# Patient Record
Sex: FEMALE | Race: WHITE | NOT HISPANIC OR LATINO | Employment: FULL TIME | URBAN - METROPOLITAN AREA
[De-identification: names, ages, dates, MRNs, and addresses within clinical notes are randomized per-mention and may not be internally consistent; named-entity substitution may affect disease eponyms.]

---

## 2018-09-20 ENCOUNTER — HOSPITAL ENCOUNTER (OUTPATIENT)
Facility: MEDICAL CENTER | Age: 63
End: 2018-09-20
Attending: SPECIALIST
Payer: COMMERCIAL

## 2018-09-20 LAB
CREAT UR-MCNC: 16.5 MG/DL
PROT UR-MCNC: <4 MG/DL (ref 0–15)
PROT/CREAT UR: NORMAL MG/G (ref 10–107)

## 2018-09-20 PROCEDURE — 84156 ASSAY OF PROTEIN URINE: CPT

## 2018-09-20 PROCEDURE — 82570 ASSAY OF URINE CREATININE: CPT

## 2018-09-27 ENCOUNTER — HOSPITAL ENCOUNTER (OUTPATIENT)
Dept: RADIOLOGY | Facility: MEDICAL CENTER | Age: 63
End: 2018-09-27
Attending: OBSTETRICS & GYNECOLOGY
Payer: COMMERCIAL

## 2018-09-27 DIAGNOSIS — Z78.0 MENOPAUSE: ICD-10-CM

## 2018-09-27 DIAGNOSIS — M89.9 BONE DISEASE: ICD-10-CM

## 2018-09-27 PROCEDURE — 77080 DXA BONE DENSITY AXIAL: CPT

## 2018-11-15 ENCOUNTER — HOSPITAL ENCOUNTER (OUTPATIENT)
Dept: LAB | Facility: MEDICAL CENTER | Age: 63
End: 2018-11-15
Attending: SPECIALIST
Payer: COMMERCIAL

## 2018-11-15 LAB
25(OH)D3 SERPL-MCNC: 48 NG/ML (ref 30–100)
APPEARANCE UR: CLEAR
BACTERIA #/AREA URNS HPF: NEGATIVE /HPF
BASOPHILS # BLD AUTO: 1.6 % (ref 0–1.8)
BASOPHILS # BLD: 0.06 K/UL (ref 0–0.12)
BILIRUB UR QL STRIP.AUTO: NEGATIVE
COLOR UR: YELLOW
EOSINOPHIL # BLD AUTO: 0.16 K/UL (ref 0–0.51)
EOSINOPHIL NFR BLD: 4.3 % (ref 0–6.9)
EPI CELLS #/AREA URNS HPF: ABNORMAL /HPF
ERYTHROCYTE [DISTWIDTH] IN BLOOD BY AUTOMATED COUNT: 47.4 FL (ref 35.9–50)
GLUCOSE UR STRIP.AUTO-MCNC: NEGATIVE MG/DL
HCT VFR BLD AUTO: 43.8 % (ref 37–47)
HGB BLD-MCNC: 14.2 G/DL (ref 12–16)
HYALINE CASTS #/AREA URNS LPF: ABNORMAL /LPF
IMM GRANULOCYTES # BLD AUTO: 0.01 K/UL (ref 0–0.11)
IMM GRANULOCYTES NFR BLD AUTO: 0.3 % (ref 0–0.9)
KETONES UR STRIP.AUTO-MCNC: NEGATIVE MG/DL
LEUKOCYTE ESTERASE UR QL STRIP.AUTO: ABNORMAL
LYMPHOCYTES # BLD AUTO: 1.25 K/UL (ref 1–4.8)
LYMPHOCYTES NFR BLD: 33.6 % (ref 22–41)
MCH RBC QN AUTO: 31.3 PG (ref 27–33)
MCHC RBC AUTO-ENTMCNC: 32.4 G/DL (ref 33.6–35)
MCV RBC AUTO: 96.5 FL (ref 81.4–97.8)
MICRO URNS: ABNORMAL
MONOCYTES # BLD AUTO: 0.47 K/UL (ref 0–0.85)
MONOCYTES NFR BLD AUTO: 12.6 % (ref 0–13.4)
NEUTROPHILS # BLD AUTO: 1.77 K/UL (ref 2–7.15)
NEUTROPHILS NFR BLD: 47.6 % (ref 44–72)
NITRITE UR QL STRIP.AUTO: NEGATIVE
NRBC # BLD AUTO: 0 K/UL
NRBC BLD-RTO: 0 /100 WBC
PH UR STRIP.AUTO: 7 [PH]
PLATELET # BLD AUTO: 193 K/UL (ref 164–446)
PMV BLD AUTO: 10.8 FL (ref 9–12.9)
PROT UR QL STRIP: NEGATIVE MG/DL
RBC # BLD AUTO: 4.54 M/UL (ref 4.2–5.4)
RBC # URNS HPF: ABNORMAL /HPF
RBC UR QL AUTO: NEGATIVE
SP GR UR STRIP.AUTO: 1.01
UROBILINOGEN UR STRIP.AUTO-MCNC: 0.2 MG/DL
WBC # BLD AUTO: 3.7 K/UL (ref 4.8–10.8)
WBC #/AREA URNS HPF: ABNORMAL /HPF

## 2018-11-15 PROCEDURE — 82570 ASSAY OF URINE CREATININE: CPT

## 2018-11-15 PROCEDURE — 81001 URINALYSIS AUTO W/SCOPE: CPT

## 2018-11-15 PROCEDURE — 36415 COLL VENOUS BLD VENIPUNCTURE: CPT

## 2018-11-15 PROCEDURE — 80076 HEPATIC FUNCTION PANEL: CPT

## 2018-11-15 PROCEDURE — 82306 VITAMIN D 25 HYDROXY: CPT

## 2018-11-15 PROCEDURE — 85025 COMPLETE CBC W/AUTO DIFF WBC: CPT

## 2018-11-15 PROCEDURE — 82565 ASSAY OF CREATININE: CPT

## 2018-11-15 PROCEDURE — 84156 ASSAY OF PROTEIN URINE: CPT

## 2018-11-16 LAB
ALBUMIN SERPL BCP-MCNC: 4.4 G/DL (ref 3.2–4.9)
ALP SERPL-CCNC: 47 U/L (ref 30–99)
ALT SERPL-CCNC: 19 U/L (ref 2–50)
AST SERPL-CCNC: 24 U/L (ref 12–45)
BILIRUB CONJ SERPL-MCNC: 0.2 MG/DL (ref 0.1–0.5)
BILIRUB INDIRECT SERPL-MCNC: 0.3 MG/DL (ref 0–1)
BILIRUB SERPL-MCNC: 0.5 MG/DL (ref 0.1–1.5)
CREAT SERPL-MCNC: 0.86 MG/DL (ref 0.5–1.4)
CREAT UR-MCNC: 46.8 MG/DL
PROT SERPL-MCNC: 7.1 G/DL (ref 6–8.2)
PROT UR-MCNC: <4 MG/DL (ref 0–15)
PROT/CREAT UR: NORMAL MG/G (ref 10–107)

## 2018-12-24 ENCOUNTER — HOSPITAL ENCOUNTER (OUTPATIENT)
Dept: LAB | Facility: MEDICAL CENTER | Age: 63
End: 2018-12-24
Attending: SPECIALIST
Payer: COMMERCIAL

## 2018-12-24 LAB
BASOPHILS # BLD AUTO: 1.6 % (ref 0–1.8)
BASOPHILS # BLD: 0.06 K/UL (ref 0–0.12)
C3 SERPL-MCNC: 146 MG/DL (ref 87–200)
C4 SERPL-MCNC: 31 MG/DL (ref 19–52)
CREAT SERPL-MCNC: 0.95 MG/DL (ref 0.5–1.4)
CREAT UR-MCNC: 223.5 MG/DL
EOSINOPHIL # BLD AUTO: 0.15 K/UL (ref 0–0.51)
EOSINOPHIL NFR BLD: 4 % (ref 0–6.9)
ERYTHROCYTE [DISTWIDTH] IN BLOOD BY AUTOMATED COUNT: 49 FL (ref 35.9–50)
HCT VFR BLD AUTO: 45.2 % (ref 37–47)
HGB BLD-MCNC: 14.2 G/DL (ref 12–16)
IMM GRANULOCYTES # BLD AUTO: 0.01 K/UL (ref 0–0.11)
IMM GRANULOCYTES NFR BLD AUTO: 0.3 % (ref 0–0.9)
LYMPHOCYTES # BLD AUTO: 1.13 K/UL (ref 1–4.8)
LYMPHOCYTES NFR BLD: 30.2 % (ref 22–41)
MCH RBC QN AUTO: 30.9 PG (ref 27–33)
MCHC RBC AUTO-ENTMCNC: 31.4 G/DL (ref 33.6–35)
MCV RBC AUTO: 98.5 FL (ref 81.4–97.8)
MONOCYTES # BLD AUTO: 0.4 K/UL (ref 0–0.85)
MONOCYTES NFR BLD AUTO: 10.7 % (ref 0–13.4)
NEUTROPHILS # BLD AUTO: 1.99 K/UL (ref 2–7.15)
NEUTROPHILS NFR BLD: 53.2 % (ref 44–72)
NRBC # BLD AUTO: 0 K/UL
NRBC BLD-RTO: 0 /100 WBC
PLATELET # BLD AUTO: 207 K/UL (ref 164–446)
PMV BLD AUTO: 10.9 FL (ref 9–12.9)
PROT UR-MCNC: 70.3 MG/DL (ref 0–15)
PROT/CREAT UR: 315 MG/G (ref 10–107)
RBC # BLD AUTO: 4.59 M/UL (ref 4.2–5.4)
WBC # BLD AUTO: 3.7 K/UL (ref 4.8–10.8)

## 2018-12-24 PROCEDURE — 82565 ASSAY OF CREATININE: CPT

## 2018-12-24 PROCEDURE — 86160 COMPLEMENT ANTIGEN: CPT

## 2018-12-24 PROCEDURE — 36415 COLL VENOUS BLD VENIPUNCTURE: CPT

## 2018-12-24 PROCEDURE — 82570 ASSAY OF URINE CREATININE: CPT

## 2018-12-24 PROCEDURE — 85025 COMPLETE CBC W/AUTO DIFF WBC: CPT

## 2018-12-24 PROCEDURE — 84156 ASSAY OF PROTEIN URINE: CPT

## 2019-02-28 ENCOUNTER — HOSPITAL ENCOUNTER (OUTPATIENT)
Dept: RADIOLOGY | Facility: MEDICAL CENTER | Age: 64
End: 2019-02-28
Attending: SPECIALIST
Payer: COMMERCIAL

## 2019-02-28 DIAGNOSIS — R20.2 PARESTHESIA: ICD-10-CM

## 2019-02-28 DIAGNOSIS — M54.2 CERVICALGIA: ICD-10-CM

## 2019-02-28 PROCEDURE — 72050 X-RAY EXAM NECK SPINE 4/5VWS: CPT

## 2019-04-04 ENCOUNTER — HOSPITAL ENCOUNTER (OUTPATIENT)
Dept: LAB | Facility: MEDICAL CENTER | Age: 64
End: 2019-04-04
Attending: SPECIALIST
Payer: COMMERCIAL

## 2019-04-04 LAB
ALBUMIN SERPL BCP-MCNC: 4.2 G/DL (ref 3.2–4.9)
ALP SERPL-CCNC: 53 U/L (ref 30–99)
ALT SERPL-CCNC: 23 U/L (ref 2–50)
AST SERPL-CCNC: 24 U/L (ref 12–45)
BASOPHILS # BLD AUTO: 1 % (ref 0–1.8)
BASOPHILS # BLD: 0.05 K/UL (ref 0–0.12)
BILIRUB CONJ SERPL-MCNC: <0.1 MG/DL (ref 0.1–0.5)
BILIRUB INDIRECT SERPL-MCNC: NORMAL MG/DL (ref 0–1)
BILIRUB SERPL-MCNC: 0.2 MG/DL (ref 0.1–1.5)
CREAT SERPL-MCNC: 0.87 MG/DL (ref 0.5–1.4)
CREAT UR-MCNC: 113.2 MG/DL
EOSINOPHIL # BLD AUTO: 0.19 K/UL (ref 0–0.51)
EOSINOPHIL NFR BLD: 4 % (ref 0–6.9)
ERYTHROCYTE [DISTWIDTH] IN BLOOD BY AUTOMATED COUNT: 47.9 FL (ref 35.9–50)
HCT VFR BLD AUTO: 38.8 % (ref 37–47)
HGB BLD-MCNC: 12.8 G/DL (ref 12–16)
IMM GRANULOCYTES # BLD AUTO: 0.01 K/UL (ref 0–0.11)
IMM GRANULOCYTES NFR BLD AUTO: 0.2 % (ref 0–0.9)
LYMPHOCYTES # BLD AUTO: 1.5 K/UL (ref 1–4.8)
LYMPHOCYTES NFR BLD: 31.3 % (ref 22–41)
MCH RBC QN AUTO: 31.8 PG (ref 27–33)
MCHC RBC AUTO-ENTMCNC: 33 G/DL (ref 33.6–35)
MCV RBC AUTO: 96.5 FL (ref 81.4–97.8)
MONOCYTES # BLD AUTO: 0.49 K/UL (ref 0–0.85)
MONOCYTES NFR BLD AUTO: 10.2 % (ref 0–13.4)
NEUTROPHILS # BLD AUTO: 2.56 K/UL (ref 2–7.15)
NEUTROPHILS NFR BLD: 53.3 % (ref 44–72)
NRBC # BLD AUTO: 0 K/UL
NRBC BLD-RTO: 0 /100 WBC
PLATELET # BLD AUTO: 194 K/UL (ref 164–446)
PMV BLD AUTO: 11.2 FL (ref 9–12.9)
PROT SERPL-MCNC: 6.6 G/DL (ref 6–8.2)
PROT UR-MCNC: 7 MG/DL (ref 0–15)
PROT/CREAT UR: 62 MG/G (ref 10–107)
RBC # BLD AUTO: 4.02 M/UL (ref 4.2–5.4)
WBC # BLD AUTO: 4.8 K/UL (ref 4.8–10.8)

## 2019-04-04 PROCEDURE — 85025 COMPLETE CBC W/AUTO DIFF WBC: CPT

## 2019-04-04 PROCEDURE — 81001 URINALYSIS AUTO W/SCOPE: CPT

## 2019-04-04 PROCEDURE — 80076 HEPATIC FUNCTION PANEL: CPT

## 2019-04-04 PROCEDURE — 82570 ASSAY OF URINE CREATININE: CPT

## 2019-04-04 PROCEDURE — 84156 ASSAY OF PROTEIN URINE: CPT

## 2019-04-04 PROCEDURE — 36415 COLL VENOUS BLD VENIPUNCTURE: CPT

## 2019-04-04 PROCEDURE — 82565 ASSAY OF CREATININE: CPT

## 2019-04-05 LAB
APPEARANCE UR: ABNORMAL
BACTERIA #/AREA URNS HPF: NEGATIVE /HPF
BILIRUB UR QL STRIP.AUTO: NEGATIVE
CAOX CRY #/AREA URNS HPF: ABNORMAL /HPF
COLOR UR: YELLOW
EPI CELLS #/AREA URNS HPF: NEGATIVE /HPF
GLUCOSE UR STRIP.AUTO-MCNC: NEGATIVE MG/DL
HYALINE CASTS #/AREA URNS LPF: ABNORMAL /LPF
KETONES UR STRIP.AUTO-MCNC: NEGATIVE MG/DL
LEUKOCYTE ESTERASE UR QL STRIP.AUTO: ABNORMAL
MICRO URNS: ABNORMAL
NITRITE UR QL STRIP.AUTO: NEGATIVE
PH UR STRIP.AUTO: 7 [PH]
PROT UR QL STRIP: NEGATIVE MG/DL
RBC # URNS HPF: ABNORMAL /HPF
RBC UR QL AUTO: NEGATIVE
SP GR UR STRIP.AUTO: 1.02
UROBILINOGEN UR STRIP.AUTO-MCNC: 0.2 MG/DL
WBC #/AREA URNS HPF: ABNORMAL /HPF

## 2019-04-19 ENCOUNTER — HOSPITAL ENCOUNTER (OUTPATIENT)
Dept: RADIOLOGY | Facility: MEDICAL CENTER | Age: 64
End: 2019-04-19
Attending: SPECIALIST
Payer: COMMERCIAL

## 2019-04-19 VITALS — BODY MASS INDEX: 19.62 KG/M2 | HEIGHT: 67 IN | WEIGHT: 125 LBS

## 2019-04-19 DIAGNOSIS — R20.2 PARESTHESIA: ICD-10-CM

## 2019-04-19 DIAGNOSIS — M32.19 LUPUS ENCEPHALITIS (HCC): ICD-10-CM

## 2019-04-19 DIAGNOSIS — G05.3 LUPUS ENCEPHALITIS (HCC): ICD-10-CM

## 2019-04-19 DIAGNOSIS — M54.2 CERVICALGIA: ICD-10-CM

## 2019-04-19 PROCEDURE — 700102 HCHG RX REV CODE 250 W/ 637 OVERRIDE(OP): Performed by: RADIOLOGY

## 2019-04-19 PROCEDURE — 72141 MRI NECK SPINE W/O DYE: CPT

## 2019-04-19 PROCEDURE — A9270 NON-COVERED ITEM OR SERVICE: HCPCS | Performed by: RADIOLOGY

## 2019-04-19 RX ORDER — ALPRAZOLAM 1 MG/1
0.5 TABLET ORAL
Status: COMPLETED | OUTPATIENT
Start: 2019-04-19 | End: 2019-04-19

## 2019-04-19 RX ADMIN — ALPRAZOLAM 0.5 MG: 1 TABLET ORAL at 10:07

## 2019-04-19 NOTE — DISCHARGE INSTRUCTIONS
MRI ADULT DISCHARGE INSTRUCTIONS    You have been medicated today for your scan. Please follow the instructions below to ensure your safe recovery. If you have any questions or problems, feel free to call us at [phone] or [phone].     1.   Have someone stay with you to assist you as needed.    2.   Do not drive or operate any mechanical devices.    3.   Do not perform any activity that requires concentration. Make no major decisions over the next 24 hours.     4.   Be careful changing positions from laying down to sitting or standing, as you may become dizzy.     5.   Do not drink alcohol for 48 hours.    6.   There are no restrictions for eating your normal meals. Drink fluids.    7.   You may continue your usual medications for pain, tranquilizers, muscle relaxants or sedatives when awake.     8.   Tomorrow, you may continue your normal daily activities.     9.   Pressure dressing on 10 - 15 minutes. If swelling or bleeding occurs when removed, continue placing direct pressure on injection site for another 5 minutes, or until bleeding stops.   Alprazolam (XANAX) tablet  What is this medicine?  You were prescribed ALPRAZOLAM (al PRAY shiloh gil) for the procedure you had today. This medication is a benzodiazepine. It is used to treat anxiety and panic attacks.  This medicine may be used for other purposes; ask your health care provider or pharmacist if you have questions.  What side effects may I notice from receiving this medicine?  Side effects that you should report to your doctor or health care professional as soon as possible:  • allergic reactions like skin rash, itching or hives, swelling of the face, lips, or tongue  • confusion, forgetfulness  • depression  • difficulty sleeping  • difficulty speaking  • feeling faint or lightheaded, falls  • mood changes, excitability or aggressive behavior  • muscle cramps  • trouble passing urine or change in the amount of urine  • unusually weak or tired  Side effects  that usually do not require medical attention (report to your doctor or health care professional if they continue or are bothersome):  • changes in appetite  • change in sex drive or performance  This list may not describe all possible side effects. Call your doctor for medical advice about side effects. You may report side effects to FDA at 0-752-IMN-6977.      I have been informed of and understand the above discharge instructions.

## 2022-07-22 ENCOUNTER — HOSPITAL ENCOUNTER (OUTPATIENT)
Dept: RADIOLOGY | Facility: MEDICAL CENTER | Age: 67
End: 2022-07-22
Attending: NURSE PRACTITIONER
Payer: MEDICARE

## 2022-07-22 DIAGNOSIS — R19.8 RASPBERRY TONGUE: ICD-10-CM

## 2022-07-22 DIAGNOSIS — R10.13 ABDOMINAL PAIN, EPIGASTRIC: ICD-10-CM

## 2022-07-22 PROCEDURE — 74019 RADEX ABDOMEN 2 VIEWS: CPT

## 2022-09-17 ENCOUNTER — HOSPITAL ENCOUNTER (OUTPATIENT)
Dept: LAB | Facility: MEDICAL CENTER | Age: 67
End: 2022-09-17
Attending: NURSE PRACTITIONER
Payer: MEDICARE

## 2022-09-17 LAB
ALBUMIN SERPL BCP-MCNC: 4.5 G/DL (ref 3.2–4.9)
ALBUMIN/GLOB SERPL: 2 G/DL
ALP SERPL-CCNC: 56 U/L (ref 30–99)
ALT SERPL-CCNC: 20 U/L (ref 2–50)
ANION GAP SERPL CALC-SCNC: 13 MMOL/L (ref 7–16)
AST SERPL-CCNC: 25 U/L (ref 12–45)
BASOPHILS # BLD AUTO: 1.9 % (ref 0–1.8)
BASOPHILS # BLD: 0.06 K/UL (ref 0–0.12)
BILIRUB SERPL-MCNC: 0.4 MG/DL (ref 0.1–1.5)
BUN SERPL-MCNC: 14 MG/DL (ref 8–22)
CALCIUM SERPL-MCNC: 9 MG/DL (ref 8.5–10.5)
CHLORIDE SERPL-SCNC: 104 MMOL/L (ref 96–112)
CO2 SERPL-SCNC: 24 MMOL/L (ref 20–33)
CREAT SERPL-MCNC: 0.98 MG/DL (ref 0.5–1.4)
EOSINOPHIL # BLD AUTO: 0.07 K/UL (ref 0–0.51)
EOSINOPHIL NFR BLD: 2.3 % (ref 0–6.9)
ERYTHROCYTE [DISTWIDTH] IN BLOOD BY AUTOMATED COUNT: 50.4 FL (ref 35.9–50)
GFR SERPLBLD CREATININE-BSD FMLA CKD-EPI: 64 ML/MIN/1.73 M 2
GLOBULIN SER CALC-MCNC: 2.3 G/DL (ref 1.9–3.5)
GLUCOSE SERPL-MCNC: 85 MG/DL (ref 65–99)
HCT VFR BLD AUTO: 44.3 % (ref 37–47)
HGB BLD-MCNC: 14.6 G/DL (ref 12–16)
IMM GRANULOCYTES # BLD AUTO: 0.01 K/UL (ref 0–0.11)
IMM GRANULOCYTES NFR BLD AUTO: 0.3 % (ref 0–0.9)
LYMPHOCYTES # BLD AUTO: 0.96 K/UL (ref 1–4.8)
LYMPHOCYTES NFR BLD: 31 % (ref 22–41)
MCH RBC QN AUTO: 31.7 PG (ref 27–33)
MCHC RBC AUTO-ENTMCNC: 33 G/DL (ref 33.6–35)
MCV RBC AUTO: 96.3 FL (ref 81.4–97.8)
MONOCYTES # BLD AUTO: 0.58 K/UL (ref 0–0.85)
MONOCYTES NFR BLD AUTO: 18.7 % (ref 0–13.4)
NEUTROPHILS # BLD AUTO: 1.42 K/UL (ref 2–7.15)
NEUTROPHILS NFR BLD: 45.8 % (ref 44–72)
NRBC # BLD AUTO: 0 K/UL
NRBC BLD-RTO: 0 /100 WBC
PLATELET # BLD AUTO: 185 K/UL (ref 164–446)
PMV BLD AUTO: 11.3 FL (ref 9–12.9)
POTASSIUM SERPL-SCNC: 3.8 MMOL/L (ref 3.6–5.5)
PROT SERPL-MCNC: 6.8 G/DL (ref 6–8.2)
RBC # BLD AUTO: 4.6 M/UL (ref 4.2–5.4)
SODIUM SERPL-SCNC: 141 MMOL/L (ref 135–145)
WBC # BLD AUTO: 3.1 K/UL (ref 4.8–10.8)

## 2022-09-17 PROCEDURE — 85025 COMPLETE CBC W/AUTO DIFF WBC: CPT

## 2022-09-17 PROCEDURE — 36415 COLL VENOUS BLD VENIPUNCTURE: CPT

## 2022-09-17 PROCEDURE — 80053 COMPREHEN METABOLIC PANEL: CPT

## 2022-10-19 ENCOUNTER — HOSPITAL ENCOUNTER (OUTPATIENT)
Dept: LAB | Facility: MEDICAL CENTER | Age: 67
End: 2022-10-19
Attending: FAMILY MEDICINE
Payer: COMMERCIAL

## 2022-10-19 LAB
BASOPHILS # BLD AUTO: 1.2 % (ref 0–1.8)
BASOPHILS # BLD: 0.05 K/UL (ref 0–0.12)
EOSINOPHIL # BLD AUTO: 0.08 K/UL (ref 0–0.51)
EOSINOPHIL NFR BLD: 2 % (ref 0–6.9)
ERYTHROCYTE [DISTWIDTH] IN BLOOD BY AUTOMATED COUNT: 49.1 FL (ref 35.9–50)
HCT VFR BLD AUTO: 39.8 % (ref 37–47)
HGB BLD-MCNC: 12.9 G/DL (ref 12–16)
IMM GRANULOCYTES # BLD AUTO: 0.01 K/UL (ref 0–0.11)
IMM GRANULOCYTES NFR BLD AUTO: 0.2 % (ref 0–0.9)
LYMPHOCYTES # BLD AUTO: 1.4 K/UL (ref 1–4.8)
LYMPHOCYTES NFR BLD: 34.7 % (ref 22–41)
MCH RBC QN AUTO: 31.2 PG (ref 27–33)
MCHC RBC AUTO-ENTMCNC: 32.4 G/DL (ref 33.6–35)
MCV RBC AUTO: 96.1 FL (ref 81.4–97.8)
MONOCYTES # BLD AUTO: 0.48 K/UL (ref 0–0.85)
MONOCYTES NFR BLD AUTO: 11.9 % (ref 0–13.4)
NEUTROPHILS # BLD AUTO: 2.02 K/UL (ref 2–7.15)
NEUTROPHILS NFR BLD: 50 % (ref 44–72)
NRBC # BLD AUTO: 0 K/UL
NRBC BLD-RTO: 0 /100 WBC
PLATELET # BLD AUTO: 196 K/UL (ref 164–446)
PMV BLD AUTO: 10.5 FL (ref 9–12.9)
RBC # BLD AUTO: 4.14 M/UL (ref 4.2–5.4)
WBC # BLD AUTO: 4 K/UL (ref 4.8–10.8)

## 2022-10-19 PROCEDURE — 36415 COLL VENOUS BLD VENIPUNCTURE: CPT

## 2022-10-19 PROCEDURE — 86663 EPSTEIN-BARR ANTIBODY: CPT

## 2022-10-19 PROCEDURE — 86664 EPSTEIN-BARR NUCLEAR ANTIGEN: CPT

## 2022-10-19 PROCEDURE — 86665 EPSTEIN-BARR CAPSID VCA: CPT | Mod: 91

## 2022-10-19 PROCEDURE — 85025 COMPLETE CBC W/AUTO DIFF WBC: CPT

## 2022-10-21 LAB
EBV EA-D IGG SER-ACNC: <5 U/ML (ref 0–10.9)
EBV NA IGG SER IA-ACNC: 126 U/ML (ref 0–21.9)
EBV VCA IGG SER IA-ACNC: >750 U/ML (ref 0–21.9)
EBV VCA IGM SER IA-ACNC: <10 U/ML (ref 0–43.9)

## 2022-11-11 ENCOUNTER — PATIENT MESSAGE (OUTPATIENT)
Dept: HEALTH INFORMATION MANAGEMENT | Facility: OTHER | Age: 67
End: 2022-11-11

## 2023-09-07 ENCOUNTER — APPOINTMENT (OUTPATIENT)
Dept: ADMISSIONS | Facility: MEDICAL CENTER | Age: 68
End: 2023-09-07
Attending: PODIATRIST
Payer: COMMERCIAL

## 2023-09-13 ENCOUNTER — PRE-ADMISSION TESTING (OUTPATIENT)
Dept: ADMISSIONS | Facility: MEDICAL CENTER | Age: 68
End: 2023-09-13
Attending: PODIATRIST
Payer: COMMERCIAL

## 2023-09-13 RX ORDER — BUPROPION HYDROCHLORIDE 150 MG/1
150 TABLET ORAL DAILY
COMMUNITY
End: 2023-09-13

## 2023-09-13 RX ORDER — FLUTICASONE FUROATE, UMECLIDINIUM BROMIDE AND VILANTEROL TRIFENATATE 100; 62.5; 25 UG/1; UG/1; UG/1
1 POWDER RESPIRATORY (INHALATION) DAILY
COMMUNITY
Start: 2023-09-01

## 2023-09-15 ENCOUNTER — ANESTHESIA EVENT (OUTPATIENT)
Dept: SURGERY | Facility: MEDICAL CENTER | Age: 68
End: 2023-09-15
Payer: COMMERCIAL

## 2023-09-15 ENCOUNTER — APPOINTMENT (OUTPATIENT)
Dept: RADIOLOGY | Facility: MEDICAL CENTER | Age: 68
End: 2023-09-15
Attending: PODIATRIST
Payer: COMMERCIAL

## 2023-09-15 ENCOUNTER — ANESTHESIA (OUTPATIENT)
Dept: SURGERY | Facility: MEDICAL CENTER | Age: 68
End: 2023-09-15
Payer: COMMERCIAL

## 2023-09-15 ENCOUNTER — HOSPITAL ENCOUNTER (OUTPATIENT)
Facility: MEDICAL CENTER | Age: 68
End: 2023-09-15
Attending: PODIATRIST | Admitting: PODIATRIST
Payer: COMMERCIAL

## 2023-09-15 VITALS
OXYGEN SATURATION: 92 % | HEIGHT: 67 IN | HEART RATE: 82 BPM | DIASTOLIC BLOOD PRESSURE: 66 MMHG | RESPIRATION RATE: 14 BRPM | SYSTOLIC BLOOD PRESSURE: 132 MMHG | BODY MASS INDEX: 18.44 KG/M2 | WEIGHT: 117.5 LBS | TEMPERATURE: 97.4 F

## 2023-09-15 PROCEDURE — 700111 HCHG RX REV CODE 636 W/ 250 OVERRIDE (IP): Mod: JZ | Performed by: PODIATRIST

## 2023-09-15 PROCEDURE — 160048 HCHG OR STATISTICAL LEVEL 1-5: Performed by: PODIATRIST

## 2023-09-15 PROCEDURE — 160009 HCHG ANES TIME/MIN: Performed by: PODIATRIST

## 2023-09-15 PROCEDURE — 73630 X-RAY EXAM OF FOOT: CPT | Mod: RT

## 2023-09-15 PROCEDURE — 160036 HCHG PACU - EA ADDL 30 MINS PHASE I: Performed by: PODIATRIST

## 2023-09-15 PROCEDURE — A9270 NON-COVERED ITEM OR SERVICE: HCPCS | Performed by: ANESTHESIOLOGY

## 2023-09-15 PROCEDURE — 160035 HCHG PACU - 1ST 60 MINS PHASE I: Performed by: PODIATRIST

## 2023-09-15 PROCEDURE — 160039 HCHG SURGERY MINUTES - EA ADDL 1 MIN LEVEL 3: Performed by: PODIATRIST

## 2023-09-15 PROCEDURE — 700101 HCHG RX REV CODE 250: Performed by: ANESTHESIOLOGY

## 2023-09-15 PROCEDURE — 700101 HCHG RX REV CODE 250: Performed by: PODIATRIST

## 2023-09-15 PROCEDURE — 160046 HCHG PACU - 1ST 60 MINS PHASE II: Performed by: PODIATRIST

## 2023-09-15 PROCEDURE — C1713 ANCHOR/SCREW BN/BN,TIS/BN: HCPCS | Performed by: PODIATRIST

## 2023-09-15 PROCEDURE — 700111 HCHG RX REV CODE 636 W/ 250 OVERRIDE (IP): Performed by: ANESTHESIOLOGY

## 2023-09-15 PROCEDURE — 700105 HCHG RX REV CODE 258: Performed by: PODIATRIST

## 2023-09-15 PROCEDURE — 160025 RECOVERY II MINUTES (STATS): Performed by: PODIATRIST

## 2023-09-15 PROCEDURE — 700102 HCHG RX REV CODE 250 W/ 637 OVERRIDE(OP): Performed by: ANESTHESIOLOGY

## 2023-09-15 PROCEDURE — 160002 HCHG RECOVERY MINUTES (STAT): Performed by: PODIATRIST

## 2023-09-15 PROCEDURE — 160028 HCHG SURGERY MINUTES - 1ST 30 MINS LEVEL 3: Performed by: PODIATRIST

## 2023-09-15 DEVICE — WIRE K- SMOOTH .062 - (3TX6=18): Type: IMPLANTABLE DEVICE | Site: FOOT | Status: FUNCTIONAL

## 2023-09-15 DEVICE — IMPLANTABLE DEVICE: Type: IMPLANTABLE DEVICE | Site: FOOT | Status: FUNCTIONAL

## 2023-09-15 RX ORDER — SODIUM CHLORIDE, SODIUM LACTATE, POTASSIUM CHLORIDE, CALCIUM CHLORIDE 600; 310; 30; 20 MG/100ML; MG/100ML; MG/100ML; MG/100ML
INJECTION, SOLUTION INTRAVENOUS CONTINUOUS
Status: DISCONTINUED | OUTPATIENT
Start: 2023-09-15 | End: 2023-09-15 | Stop reason: HOSPADM

## 2023-09-15 RX ORDER — ONDANSETRON 2 MG/ML
INJECTION INTRAMUSCULAR; INTRAVENOUS PRN
Status: DISCONTINUED | OUTPATIENT
Start: 2023-09-15 | End: 2023-09-15 | Stop reason: SURG

## 2023-09-15 RX ORDER — OXYCODONE HCL 5 MG/5 ML
5 SOLUTION, ORAL ORAL
Status: DISCONTINUED | OUTPATIENT
Start: 2023-09-15 | End: 2023-09-15 | Stop reason: HOSPADM

## 2023-09-15 RX ORDER — CEFAZOLIN SODIUM 1 G/3ML
INJECTION, POWDER, FOR SOLUTION INTRAMUSCULAR; INTRAVENOUS
Status: DISCONTINUED | OUTPATIENT
Start: 2023-09-15 | End: 2023-09-15 | Stop reason: HOSPADM

## 2023-09-15 RX ORDER — HALOPERIDOL 5 MG/ML
1 INJECTION INTRAMUSCULAR
Status: DISCONTINUED | OUTPATIENT
Start: 2023-09-15 | End: 2023-09-15 | Stop reason: HOSPADM

## 2023-09-15 RX ORDER — CEFAZOLIN SODIUM 1 G/3ML
INJECTION, POWDER, FOR SOLUTION INTRAMUSCULAR; INTRAVENOUS
Status: DISCONTINUED
Start: 2023-09-15 | End: 2023-09-15 | Stop reason: HOSPADM

## 2023-09-15 RX ORDER — DEXAMETHASONE SODIUM PHOSPHATE 4 MG/ML
INJECTION, SOLUTION INTRA-ARTICULAR; INTRALESIONAL; INTRAMUSCULAR; INTRAVENOUS; SOFT TISSUE PRN
Status: DISCONTINUED | OUTPATIENT
Start: 2023-09-15 | End: 2023-09-15 | Stop reason: SURG

## 2023-09-15 RX ORDER — MIDAZOLAM HYDROCHLORIDE 1 MG/ML
INJECTION INTRAMUSCULAR; INTRAVENOUS PRN
Status: DISCONTINUED | OUTPATIENT
Start: 2023-09-15 | End: 2023-09-15 | Stop reason: SURG

## 2023-09-15 RX ORDER — CEFAZOLIN SODIUM 1 G/3ML
INJECTION, POWDER, FOR SOLUTION INTRAMUSCULAR; INTRAVENOUS PRN
Status: DISCONTINUED | OUTPATIENT
Start: 2023-09-15 | End: 2023-09-15 | Stop reason: SURG

## 2023-09-15 RX ORDER — ACETAMINOPHEN 500 MG
1000 TABLET ORAL ONCE
Status: COMPLETED | OUTPATIENT
Start: 2023-09-15 | End: 2023-09-15

## 2023-09-15 RX ORDER — LIDOCAINE HYDROCHLORIDE 10 MG/ML
INJECTION, SOLUTION INFILTRATION; PERINEURAL
Status: DISCONTINUED | OUTPATIENT
Start: 2023-09-15 | End: 2023-09-15 | Stop reason: HOSPADM

## 2023-09-15 RX ORDER — OXYCODONE HCL 5 MG/5 ML
10 SOLUTION, ORAL ORAL
Status: DISCONTINUED | OUTPATIENT
Start: 2023-09-15 | End: 2023-09-15 | Stop reason: HOSPADM

## 2023-09-15 RX ORDER — ONDANSETRON 2 MG/ML
4 INJECTION INTRAMUSCULAR; INTRAVENOUS
Status: DISCONTINUED | OUTPATIENT
Start: 2023-09-15 | End: 2023-09-15 | Stop reason: HOSPADM

## 2023-09-15 RX ORDER — LIDOCAINE HYDROCHLORIDE 20 MG/ML
INJECTION, SOLUTION EPIDURAL; INFILTRATION; INTRACAUDAL; PERINEURAL PRN
Status: DISCONTINUED | OUTPATIENT
Start: 2023-09-15 | End: 2023-09-15 | Stop reason: SURG

## 2023-09-15 RX ORDER — DIPHENHYDRAMINE HYDROCHLORIDE 50 MG/ML
12.5 INJECTION INTRAMUSCULAR; INTRAVENOUS
Status: DISCONTINUED | OUTPATIENT
Start: 2023-09-15 | End: 2023-09-15 | Stop reason: HOSPADM

## 2023-09-15 RX ORDER — BUPIVACAINE HYDROCHLORIDE 5 MG/ML
INJECTION, SOLUTION EPIDURAL; INTRACAUDAL
Status: DISCONTINUED | OUTPATIENT
Start: 2023-09-15 | End: 2023-09-15 | Stop reason: HOSPADM

## 2023-09-15 RX ADMIN — PROPOFOL 120 MG: 10 INJECTION, EMULSION INTRAVENOUS at 13:16

## 2023-09-15 RX ADMIN — FENTANYL CITRATE 50 MCG: 50 INJECTION, SOLUTION INTRAMUSCULAR; INTRAVENOUS at 13:16

## 2023-09-15 RX ADMIN — SODIUM CHLORIDE, POTASSIUM CHLORIDE, SODIUM LACTATE AND CALCIUM CHLORIDE: 600; 310; 30; 20 INJECTION, SOLUTION INTRAVENOUS at 12:38

## 2023-09-15 RX ADMIN — EPHEDRINE SULFATE 10 MG: 50 INJECTION, SOLUTION INTRAVENOUS at 13:28

## 2023-09-15 RX ADMIN — MIDAZOLAM 2 MG: 1 INJECTION, SOLUTION INTRAMUSCULAR; INTRAVENOUS at 13:16

## 2023-09-15 RX ADMIN — DEXAMETHASONE SODIUM PHOSPHATE 4 MG: 4 INJECTION INTRA-ARTICULAR; INTRALESIONAL; INTRAMUSCULAR; INTRAVENOUS; SOFT TISSUE at 13:16

## 2023-09-15 RX ADMIN — EPHEDRINE SULFATE 10 MG: 50 INJECTION, SOLUTION INTRAVENOUS at 13:19

## 2023-09-15 RX ADMIN — ONDANSETRON 4 MG: 2 INJECTION INTRAMUSCULAR; INTRAVENOUS at 13:16

## 2023-09-15 RX ADMIN — EPHEDRINE SULFATE 10 MG: 50 INJECTION, SOLUTION INTRAVENOUS at 13:32

## 2023-09-15 RX ADMIN — LIDOCAINE HYDROCHLORIDE 100 MG: 20 INJECTION, SOLUTION EPIDURAL; INFILTRATION; INTRACAUDAL at 13:16

## 2023-09-15 RX ADMIN — ACETAMINOPHEN 1000 MG: 500 TABLET, FILM COATED ORAL at 12:39

## 2023-09-15 RX ADMIN — CEFAZOLIN 2 G: 1 INJECTION, POWDER, FOR SOLUTION INTRAMUSCULAR; INTRAVENOUS at 13:14

## 2023-09-15 ASSESSMENT — PAIN DESCRIPTION - PAIN TYPE
TYPE: SURGICAL PAIN

## 2023-09-15 ASSESSMENT — PAIN SCALES - GENERAL: PAIN_LEVEL: 0

## 2023-09-15 ASSESSMENT — FIBROSIS 4 INDEX: FIB4 SCORE: 1.88

## 2023-09-15 NOTE — OR SURGEON
Immediate Post OP Note    PreOp Diagnosis:  1) HAV, R    2)   DJD great toe, R   3)  Deviated Hallux, R      PostOp Diagnosis: same      Procedure(s):  JAREN/AKIN BUNIONECTOMY RIGHT FOOT WITH ALEX/CHEILECTOMY - Wound Class: Clean  CHEILECTOMY - Wound Class: Clean    Surgeon(s):  Amrit Hector D.P.M.    Anesthesiologist/Type of Anesthesia:  Anesthesiologist: Aldair Leo M.D./General    Surgical Staff:  Circulator: Concepción Meraz R.N.; Dai Wall RCAROLINA  Relief Circulator: Amaris Nam RGeoffreyNGeoffrey; Latoya Gonzalez RGeoffreyNGeoffrey  Relief Scrub: Kristin Kamara  Scrub Person: Ortiz Lou    Specimens removed if any:  * No specimens in log *    Estimated Blood Loss: 1cc    Findings: HAV, DJD, deviated hallux, R    Complications: none        9/15/2023 2:41 PM KRISTIE JiangPGeoffreyMGeoffrey

## 2023-09-15 NOTE — ANESTHESIA POSTPROCEDURE EVALUATION
Patient: Cira Astorga    Procedure Summary     Date: 09/15/23 Room / Location: [facility] ROOM 21 / SURGERY SAME DAY [facility]    Anesthesia Start: 1314 Anesthesia Stop: 1442    Procedures:       JAREN/AKIN BUNIONECTOMY RIGHT FOOT WITH ALEX/CHEILECTOMY (Right: Foot)      CHEILECTOMY (Right: Foot) Diagnosis: (HALLUX VALGUS, ARTHRITIS, CONTRACTED JOINT OR TOE)    Surgeons: Amrit Hector D.P.M. Responsible Provider: Aldair Leo M.D.    Anesthesia Type: general ASA Status: 2          Final Anesthesia Type: general  Last vitals  BP   Blood Pressure : 112/62    Temp   36.3 °C (97.3 °F)    Pulse   66   Resp   18    SpO2   100 %      Anesthesia Post Evaluation    Patient location during evaluation: PACU  Patient participation: complete - patient participated  Level of consciousness: awake and alert  Pain score: 0    Airway patency: patent  Anesthetic complications: no  Cardiovascular status: hemodynamically stable  Respiratory status: acceptable  Hydration status: euvolemic    PONV: none          There were no known notable events for this encounter.     Nurse Pain Score: 9 (NPRS)

## 2023-09-15 NOTE — ANESTHESIA PREPROCEDURE EVALUATION
Case: [id number] Date/Time: 09/15/23 1315    Procedures:       JAREN/AKIN BUNIONECTOMY RIGHT FOOT WITH ALEX/CHEILECTOMY      CHEILECTOMY    Pre-op diagnosis: HALLUX VALGUS, ARTHRITIS, CONTRACTED JOINT OR TOE    Location: [facility] ROOM 21 / SURGERY SAME DAY [facility]    Surgeons: Amrit Hector D.P.M.          Relevant Problems   No relevant active problems       Physical Exam    Airway   Mallampati: II  TM distance: >3 FB  Neck ROM: full       Cardiovascular - normal exam  Rhythm: regular  Rate: normal  (-) murmur     Dental - normal exam           Pulmonary - normal exam  Breath sounds clear to auscultation     Abdominal    Neurological - normal exam                 Anesthesia Plan    ASA 2       Plan - general       Airway plan will be LMA          Induction: intravenous    Postoperative Plan: Postoperative administration of opioids is intended.    Pertinent diagnostic labs and testing reviewed    Informed Consent:    Anesthetic plan and risks discussed with patient.    Use of blood products discussed with: patient whom consented to blood products.

## 2023-09-15 NOTE — ANESTHESIA PROCEDURE NOTES
Airway    Date/Time: 9/15/2023 1:16 PM    Performed by: Aldair Leo M.D.  Authorized by: Aldair Leo M.D.    Location:  OR  Urgency:  Elective  Indications for Airway Management:  Anesthesia      Spontaneous Ventilation: absent    Sedation Level:  Deep  Preoxygenated: Yes    Final Airway Type:  Supraglottic airway  Final Supraglottic Airway:  Standard LMA    SGA Size:  4  Number of Attempts at Approach:  1

## 2023-09-15 NOTE — OR NURSING
1500 assumed care, introduced self to pt, she states no pain at this time.    1515 Pt had to void, using a bed pan successful, tolerated well. Updated  as well.    Phase 2  1543 pt is now phase 2 appropriate. She is getting dressed, now.     1550 Pt's Ride  is now at bedside. Pt has had some water and crackers.    1600 Pt stable to discharge.  Instructions given, patient and  verbalize understanding.     1605 PIV removed with tip intact.  Taken via Wheelchair to car.  Pt has all belongings with them.

## 2023-09-15 NOTE — DISCHARGE INSTRUCTIONS
HOME CARE INSTRUCTIONS    ACTIVITY: Rest and take it easy for the first 24 hours.  A responsible adult is recommended to remain with you during that time.  It is normal to feel sleepy.  We encourage you to not do anything that requires balance, judgment or coordination.    FOR 24 HOURS DO NOT:  Drive, operate machinery or run household appliances.  Drink beer or alcoholic beverages.  Make important decisions or sign legal documents.    SPECIAL INSTRUCTIONS: Follow instructions provided by     DIET: To avoid nausea, slowly advance diet as tolerated, avoiding spicy or greasy foods for the first day.  Add more substantial food to your diet according to your physician's instructions.  Babies can be fed formula or breast milk as soon as they are hungry.  INCREASE FLUIDS AND FIBER TO AVOID CONSTIPATION.    SURGICAL DRESSING/BATHING: keep clean dry and intact, no submerging in water. Weight as tolerated.    MEDICATIONS: Resume taking daily medication.  Take prescribed pain medication with food.  If no medication is prescribed, you may take non-aspirin pain medication if needed.  PAIN MEDICATION CAN BE VERY CONSTIPATING.  Take a stool softener or laxative such as senokot, pericolace, or milk of magnesia if needed.    Prescription given for .   Per patient already picked up.     Last pain medication given at .    1239 acetaminophen (Tylenol) tablet 1,000 mg, next dose can be given at 7 pm tonight 09/15/2023.       A follow-up appointment should be arranged with your doctor in 2 weeks; call to schedule.    You should CALL YOUR PHYSICIAN if you develop:  Fever greater than 101 degrees F.  Pain not relieved by medication, or persistent nausea or vomiting.  Excessive bleeding (blood soaking through dressing) or unexpected drainage from the wound.  Extreme redness or swelling around the incision site, drainage of pus or foul smelling drainage.  Inability to urinate or empty your bladder within 8 hours.  Problems with breathing  or chest pain.    You should call 911 if you develop problems with breathing or chest pain.  If you are unable to contact your doctor or surgical center, you should go to the nearest emergency room or urgent care center.  Physician's telephone #: [phone]    MILD FLU-LIKE SYMPTOMS ARE NORMAL.  YOU MAY EXPERIENCE GENERALIZED MUSCLE ACHES, THROAT IRRITATION, HEADACHE AND/OR SOME NAUSEA.    If any questions arise, call your doctor.  If your doctor is not available, please feel free to call the Surgical Center at [phone].  The Center is open Monday through Friday from 7AM to 7PM.      A registered nurse may call you a few days after your surgery to see how you are doing after your procedure.    You may also receive a survey in the mail within the next two weeks and we ask that you take a few moments to complete the survey and return it to us.  Our goal is to provide you with very good care and we value your comments.     Depression / Suicide Risk    As you are discharged from this [facility] facility, it is important to learn how to keep safe from harming yourself.    Recognize the warning signs:  Abrupt changes in personality, positive or negative- including increase in energy   Giving away possessions  Change in eating patterns- significant weight changes-  positive or negative  Change in sleeping patterns- unable to sleep or sleeping all the time   Unwillingness or inability to communicate  Depression  Unusual sadness, discouragement and loneliness  Talk of wanting to die  Neglect of personal appearance   Rebelliousness- reckless behavior  Withdrawal from people/activities they love  Confusion- inability to concentrate     If you or a loved one observes any of these behaviors or has concerns about self-harm, here's what you can do:  Talk about it- your feelings and reasons for harming yourself  Remove any means that you might use to hurt yourself (examples: pills, rope, extension cords, firearm)  Get  professional help from the community (Mental Health, Substance Abuse, psychological counseling)  Do not be alone:Call your Safe Contact- someone whom you trust who will be there for you.  Call your local CRISIS HOTLINE 517-4260 or 871-412-4685  Call your local Children's Mobile Crisis Response Team [place] (872) 552-9291 or www.[employer]  Call the toll free National Suicide Prevention Hotlines   National Suicide Prevention Lifeline 455-259-GRLK (9052)  [place] [facility] 800-SUICIDE (714-7226)    I acknowledge receipt and understanding of these Home Care instructions.    Bunion Surgery, Care After  This sheet gives you information about how to care for yourself after your procedure. Your health care provider may also give you more specific instructions. If you have problems or questions, contact your health care provider.  What can I expect after the procedure?  After the procedure, it is common to have:  Redness.  Pain.  Swelling.  A small amount of fluid coming from your incision.  Follow these instructions at home:  If you have a post-operative brace, boot, or shoe:  Wear the post-op (post-operative) brace, boot, or shoe as told by your health care provider. Remove it only as told by your health care provider.  Loosen the brace, boot, or shoe if your toes tingle, become numb, or turn cold and blue.  Keep the brace, boot, or shoe clean and dry.  If you have a cast:  Do not put pressure on any part of the cast until it is fully hardened. This may take several hours.  Do not stick anything inside the cast to scratch your skin. Doing that increases your risk of infection.  Check the skin around the cast every day. Tell your health care provider about any concerns.  You may put lotion on dry skin around the edges of the cast. Do not put lotion on the skin underneath the cast.  Keep the cast clean and dry.  Bathing  Do not take baths, swim, or use a hot tub until your health care provider approves.  Ask your health care provider if you may take showers.  If your brace, boot, shoe, or cast is not waterproof:  Do not let it get wet.  Cover it with a watertight covering when you take a bath or a shower.  Keep your bandage (dressing) dry until your health care provider says it can be removed.  Incision care    The dressing holds your toe in the correct position. Do not change the dressing until your health care provider approves.  Follow instructions from your health care provider about how to take care of your incision. Make sure you:  Wash your hands with soap and water for at least 20 seconds before and after you change your dressing. If soap and water are not available, use hand .  Change your dressing as told by your health care provider.  Leave stitches (sutures), skin glue, or adhesive strips in place. These skin closures may need to stay in place for 2 weeks or longer. If adhesive strip edges start to loosen and curl up, you may trim the loose edges. Do not remove adhesive strips completely unless your health care provider tells you to do that.  Check your incision area every day for signs of infection. Check for:  More redness, swelling, or pain.  Blood or more fluid.  Warmth.  Pus or a bad smell.  Managing pain, stiffness, and swelling    If directed, put ice on the affected area. To do this:  If you have a removable brace, boot, or shoe, remove it as told by your health care provider.  Put ice in a plastic bag.  Place a towel between your skin and the bag or between your cast and the bag.  Leave the ice on for 20 minutes, 2-3 times a day.  Remove the ice if your skin turns bright red. This is very important. If you cannot feel pain, heat, or cold, you have a greater risk of damage to the area.  Move your toes often to reduce stiffness and swelling.  Raise (elevate) the injured area above the level of your heart while you are sitting or lying down.  Activity  Return to your normal activities as  told by your health care provider. Ask your health care provider what activities are safe for you.  If physical therapy was prescribed, do exercises as told by your health care provider.  Driving  If you were given a sedative during the procedure, it can affect you for several hours. Do not drive or operate machinery until your health care provider says that it is safe.  Ask your health care provider when it is safe to drive if you have a brace, boot, shoe, or cast on your foot.  Safety  Do not use the affected leg to support (bear) your body weight until your health care provider says that you can. Follow weight-bearing restrictions as told. Use crutches, a cane, or a walker as told by your health care provider.  General instructions  Do not use any products that contain nicotine or tobacco, such as cigarettes, e-cigarettes, and chewing tobacco. If you need help quitting, ask your health care provider.  Take over-the-counter and prescription medicines only as told by your health care provider.  Your medicines may cause constipation. To prevent or treat constipation, you may need to:  Drink enough fluid to keep your urine pale yellow.  Take over-the-counter or prescription medicines.  Eat foods that are high in fiber, such as beans, whole grains, and fresh fruits and vegetables.  Limit foods that are high in fat and processed sugars, such as fried or sweet foods.  Do not wear high heels or tight-fitting shoes, even after you heal.  Keep all follow-up visits. This is important.  Contact a health care provider if:  You have any of these signs of infection:  More redness, swelling, or pain around your incision.  Blood or more fluid coming from your incision.  Warmth coming from your incision.  Pus or a bad smell coming from your incision.  A fever or chills.  Your dressing gets wet or it falls off.  You have swelling in your lower leg or calf.  You have numbness or stiffness in your toes.  Get help right away if:  You  have severe pain.  You have shortness of breath or trouble breathing.  You have chest pain.  You have redness, swelling, pain, or warmth in your calf or leg.  These symptoms may represent a serious problem that is an emergency. Do not wait to see if the symptoms will go away. Get medical help right away. Call your local emergency services (911 in the [place] Do not drive yourself to the hospital.  Summary  If directed, put ice on the affected area. Leave the ice on for 20 minutes, 2-3 times a day.  Ask your health care provider when it is safe to drive if you have a brace, boot, shoe, or cast on your foot.  Do not use the affected leg to support (bear) your body weight until your health care provider says that you can. Follow weight-bearing restrictions as told. Use crutches, a cane, or a walker as told by your health care provider.  This information is not intended to replace advice given to you by your health care provider. Make sure you discuss any questions you have with your health care provider.  Document Revised: 04/23/2021 Document Reviewed: 04/23/2021  [employer]vier Patient Education © 2023 [employer]

## 2023-09-15 NOTE — OP REPORT
DATE OF SERVICE:  09/15/2023     LOCATION:  [facility].     SURGEON:  Amrit Hector DPM.     ASSISTANT:  None.     ANESTHESIOLOGIST:  Dr. Leo.     ANESTHESIA:  General anesthesia was utilized.  Please refer to   anesthesiologist's notes for exact detail, 20 mL of 50:50 mixture of 1%   lidocaine plain and 0.5% Marcaine plain was utilized for the preoperative   block.     PREOPERATIVE DIAGNOSES:   1.  Hallux abducto valgus, right.  2.  Degenerative joint disease, base of the hallux, right.  3.  Contracted great toe, right.     POSTOPERATIVE DIAGNOSES:  1.  Hallux abducto valgus, right.  2.  Degenerative joint disease, base of the hallux, right.  3.  Contracted great toe, right.     PROCEDURES:   1.  Theo Robbin bunionectomy, right with the 28-gauge monofilament wire and   the 0.062 K-wire fixation.  2.  Cheilectomy base of the great toe, right.  3.  Courtney procedure, right.     HEMOSTASIS:  Ankle cuff.     COMPLICATIONS:  None.     PATHOLOGY:  None.     MATERIALS:  A 3-0 and 4-0 Vicryl, 5-0 nylon, 0.062 K-wire retained, 0.035   K-wire, which was extirpated from the wound and the 28-gauge monofilament   wire, which was retained within the wound.     DESCRIPTION OF PROCEDURE:  The patient was brought to the OR via a cart,   placed on the OR table in supine position, at which time general anesthesia   was administered.  Local infiltration Valdez block was performed with the   aforementioned material right foot, first MPJ and great toe.  Sterile prepping   and draping was accomplished.  Esmarch bandage used for exsanguination.    Ankle cuff inflated to 250 mmHg.  Attention directed to the dorsomedial aspect   of the first MPJ of the right foot where an approximately 3.5-4 cm length   linear longitudinal incision was made just medial to the long extensor tendon.    This initial skin incision was deepened in the same plane with care taken to   retract vital structures, coagulate bleeders as  necessary.  Linear   capsulotomy was performed.  Dissection at the MPJ and distal towards the   interphalangeal joint of the hallux, right.  Mild degeneration noted at the   dorsomedial eminence was resected with power oscillating saw.  Next, a 0.035   K-wire was utilized for subchondral drilling at the dorsomedial aspect of the   joint where there is a partial thickness composing 10% of the articular head   dorsomedial partial thickness 50% thickness through the cartilaginous regions   and there was a central medial aspect of the base of the proximal phalanx with   encompassing 10% of the base of the proximal phalanx, a 75% defect.    Subchondral drilling was performed with a 0.035 K-wire also. Rongeur was   utilized to resect the spurring at the dorsomedial aspect and mild lateral   aspect at the base of the proximal phalanx.  Complication of the   aforementioned lateral release was performed and extensor digitorum brevis was   resected. Freeing of the fibular sesamoid with all soft tissues with the   exception of the intersesamoidal ligament, Courtney procedure with the   transection of the conjoined tendon of the adductor hallucis. Upon completion   of Courtney the power saw was utilized to create a Chevron osteotomy with the   apex distal, the wings proximal at 60-degree angulation at about the   metatarsal head and neck region.  Capital fragment shifted laterally reducing   the intermetatarsal angle to more normal proportions, temporary fixation with   0.035 K-wire from dorsal proximal to distal lateral and then a 0.062 K-wire   was inserted from plantar distal lateral to proximal dorsomedial. Percutaneous   K-wire was cut and bent and safety cap placed.  Temporary 0.035 K-wire was   extirpated from the wound.  A power saw was utilized to resect the jagged   medial eminence formed from the shifting of the capital fragment.  Good   stability was noted.  Next, attention to the dorsal and dorsomedial aspect of    the base of the proximal phalanx where an Robbin osteotomy was performed 3 mm   base, apex lateral aspect of the base of the proximal phalanx with power   oscillating saw was utilized, which was removed.  Osteotomy site was closed.    Good approximation was noted.  Good correction was noted. A 0.062 K-wire was   used proximal and distal to the medial osteotomy site drilling towards the   central area.  Next point, a 20-gauge monofilament wire was fished through   with the Madi-Marky fork and hook mechanism with the hook mechanism to   grab the metal suture pulled through the osteotomy site, bound upon itself   medially and the osteotomy site was closed and stable.  Though excess wire was   tucked and resected, area was flushed with copious amounts of normal saline   solution mixed with Ancef.  Capsular closure performed with 3-0 Vicryl in a   simple interrupted technique, subcutaneous closure with 4-0 Vicryl horizontal   mattress technique.  A 5-0 nylon was used for interrupted suture for the 5-0   nylon.  A K-wire cap earlier had been bent and cut, and safety cap placed.    Area was cleansed with normal saline and Ancef dry gauze.  Xeroform was   placed, 4x4 gauze, 3-inch Junior, followed by Coban.  Tourniquet was released   prior to the dressing.  Capillary fill time 1 second digits 1 through 5 of the   right foot.  The patient tolerated the procedure and anesthesia well.    Postoperative boot was placed to secure the region.  Capillary fill time again   1 second to digits 1 through 5 of the right foot.  Postoperatively, she will   be discharged home.  Postoperative x-rays will be taken to confirm the good   position of the osteotomy sites prior to leaving. The patient has the pain   medications postoperatively in the form of Norco 5/325.  She has ibuprofen for   adjunct pain management  600 mg 3 times a day as needed.  She has Keflex 500   mg 4 times a day for prophylactic antibiosis for 10 days.  Patient has    postoperative boot, not to walk without the boot.  She has a cast cover for   quick showers.  She is to wear the boot in bed and home, and out of home.  She   is allowed to remove the boot when resting on a couch and go through the   range of motion exercises, deep breathing exercises were also reviewed prior   to her and her  through an .  She will follow in the office   next week.  To call if problems, questions, or concerns.        ______________________________  JANELLE MARSHALL/RUB/JAEL    DD:  09/15/2023 14:52  DT:  09/15/2023 16:35    Job#:  [id number]    CC:Drake Walker MD

## 2023-09-15 NOTE — ANESTHESIA TIME REPORT
Anesthesia Start and Stop Event Times     Date Time Event    9/15/2023 1259 Ready for Procedure     1314 Anesthesia Start     1442 Anesthesia Stop        Responsible Staff  09/15/23    Name Role Begin End    Aldair Leo M.D. Anesth 1314 1442        Overtime Reason:  no overtime (within assigned shift)    Comments:

## 2023-09-15 NOTE — OR NURSING
1440 Pt arrived to PACU from OR via gurney. Report received from OR RN and anesthesiologist. Monitor applied.  Pt sleeping, respirations even and unlabored. Oral airway in place. Right foot dressing CDI. Toes pink. Ice bag applied.     1451 Pt awakening, oral airway removed, denies pain and nausea    1500 Handoff to Marlin SANCHEZ

## 2025-04-10 ENCOUNTER — HOSPITAL ENCOUNTER (OUTPATIENT)
Dept: LAB | Facility: MEDICAL CENTER | Age: 70
End: 2025-04-10
Attending: FAMILY MEDICINE
Payer: MEDICARE

## 2025-04-10 LAB
APPEARANCE UR: CLEAR
BACTERIA #/AREA URNS HPF: NORMAL /HPF
BASOPHILS # BLD AUTO: 1 % (ref 0–1.8)
BASOPHILS # BLD: 0.04 K/UL (ref 0–0.12)
BILIRUB UR QL STRIP.AUTO: NEGATIVE
CASTS URNS QL MICRO: NORMAL /LPF (ref 0–2)
COLOR UR: YELLOW
EOSINOPHIL # BLD AUTO: 0.1 K/UL (ref 0–0.51)
EOSINOPHIL NFR BLD: 2.6 % (ref 0–6.9)
EPITHELIAL CELLS 1715: NORMAL /HPF (ref 0–5)
ERYTHROCYTE [DISTWIDTH] IN BLOOD BY AUTOMATED COUNT: 49.3 FL (ref 35.9–50)
GLUCOSE UR STRIP.AUTO-MCNC: NEGATIVE MG/DL
HCT VFR BLD AUTO: 43 % (ref 37–47)
HGB BLD-MCNC: 13.6 G/DL (ref 12–16)
IMM GRANULOCYTES # BLD AUTO: 0.01 K/UL (ref 0–0.11)
IMM GRANULOCYTES NFR BLD AUTO: 0.3 % (ref 0–0.9)
KETONES UR STRIP.AUTO-MCNC: ABNORMAL MG/DL
LEUKOCYTE ESTERASE UR QL STRIP.AUTO: ABNORMAL
LYMPHOCYTES # BLD AUTO: 1.31 K/UL (ref 1–4.8)
LYMPHOCYTES NFR BLD: 33.6 % (ref 22–41)
MCH RBC QN AUTO: 30.3 PG (ref 27–33)
MCHC RBC AUTO-ENTMCNC: 31.6 G/DL (ref 32.2–35.5)
MCV RBC AUTO: 95.8 FL (ref 81.4–97.8)
MICRO URNS: ABNORMAL
MONOCYTES # BLD AUTO: 0.55 K/UL (ref 0–0.85)
MONOCYTES NFR BLD AUTO: 14.1 % (ref 0–13.4)
NEUTROPHILS # BLD AUTO: 1.89 K/UL (ref 1.82–7.42)
NEUTROPHILS NFR BLD: 48.4 % (ref 44–72)
NITRITE UR QL STRIP.AUTO: NEGATIVE
NRBC # BLD AUTO: 0 K/UL
NRBC BLD-RTO: 0 /100 WBC (ref 0–0.2)
PH UR STRIP.AUTO: 7 [PH] (ref 5–8)
PLATELET # BLD AUTO: 232 K/UL (ref 164–446)
PMV BLD AUTO: 10.4 FL (ref 9–12.9)
PROT UR QL STRIP: NEGATIVE MG/DL
RBC # BLD AUTO: 4.49 M/UL (ref 4.2–5.4)
RBC # URNS HPF: NORMAL /HPF (ref 0–2)
RBC UR QL AUTO: NEGATIVE
SP GR UR STRIP.AUTO: 1.02
T3FREE SERPL-MCNC: 3.57 PG/ML (ref 2–4.4)
T4 FREE SERPL-MCNC: 1.32 NG/DL (ref 0.93–1.7)
TSH SERPL-ACNC: 2.88 UIU/ML (ref 0.38–5.33)
UROBILINOGEN UR STRIP.AUTO-MCNC: 1 EU/DL
WBC # BLD AUTO: 3.9 K/UL (ref 4.8–10.8)
WBC #/AREA URNS HPF: NORMAL /HPF

## 2025-04-10 PROCEDURE — 84439 ASSAY OF FREE THYROXINE: CPT

## 2025-04-10 PROCEDURE — 84443 ASSAY THYROID STIM HORMONE: CPT

## 2025-04-10 PROCEDURE — 84481 FREE ASSAY (FT-3): CPT

## 2025-04-10 PROCEDURE — 81001 URINALYSIS AUTO W/SCOPE: CPT

## 2025-04-10 PROCEDURE — 85025 COMPLETE CBC W/AUTO DIFF WBC: CPT

## 2025-04-10 PROCEDURE — 36415 COLL VENOUS BLD VENIPUNCTURE: CPT

## 2025-04-26 ENCOUNTER — OFFICE VISIT (OUTPATIENT)
Dept: URGENT CARE | Facility: CLINIC | Age: 70
End: 2025-04-26
Payer: MEDICARE

## 2025-04-26 VITALS
HEART RATE: 89 BPM | WEIGHT: 118 LBS | BODY MASS INDEX: 18.52 KG/M2 | HEIGHT: 67 IN | SYSTOLIC BLOOD PRESSURE: 104 MMHG | DIASTOLIC BLOOD PRESSURE: 62 MMHG | TEMPERATURE: 98.3 F | OXYGEN SATURATION: 97 % | RESPIRATION RATE: 15 BRPM

## 2025-04-26 DIAGNOSIS — J44.1 COPD WITH ACUTE EXACERBATION (HCC): ICD-10-CM

## 2025-04-26 PROCEDURE — 3074F SYST BP LT 130 MM HG: CPT | Performed by: PHYSICIAN ASSISTANT

## 2025-04-26 PROCEDURE — 99204 OFFICE O/P NEW MOD 45 MIN: CPT | Performed by: PHYSICIAN ASSISTANT

## 2025-04-26 PROCEDURE — 3078F DIAST BP <80 MM HG: CPT | Performed by: PHYSICIAN ASSISTANT

## 2025-04-26 RX ORDER — METHYLPREDNISOLONE 4 MG/1
TABLET ORAL
Qty: 21 TABLET | Refills: 0 | Status: SHIPPED | OUTPATIENT
Start: 2025-04-26

## 2025-04-26 RX ORDER — ALBUTEROL SULFATE 90 UG/1
INHALANT RESPIRATORY (INHALATION)
COMMUNITY

## 2025-04-26 RX ORDER — DOXYCYCLINE 100 MG/1
100 CAPSULE ORAL 2 TIMES DAILY
Qty: 10 CAPSULE | Refills: 0 | Status: SHIPPED | OUTPATIENT
Start: 2025-04-26 | End: 2025-04-26

## 2025-04-26 RX ORDER — METHYLPREDNISOLONE 4 MG/1
TABLET ORAL
Qty: 21 TABLET | Refills: 0 | Status: SHIPPED | OUTPATIENT
Start: 2025-04-26 | End: 2025-04-26

## 2025-04-26 RX ORDER — DOXYCYCLINE 100 MG/1
100 CAPSULE ORAL 2 TIMES DAILY
Qty: 10 CAPSULE | Refills: 0 | Status: SHIPPED | OUTPATIENT
Start: 2025-04-26 | End: 2025-05-01

## 2025-04-26 RX ORDER — BENZONATATE 100 MG/1
100 CAPSULE ORAL 3 TIMES DAILY PRN
Qty: 20 CAPSULE | Refills: 0 | Status: SHIPPED | OUTPATIENT
Start: 2025-04-26

## 2025-04-26 RX ORDER — BENZONATATE 100 MG/1
100 CAPSULE ORAL 3 TIMES DAILY PRN
Qty: 20 CAPSULE | Refills: 0 | Status: SHIPPED | OUTPATIENT
Start: 2025-04-26 | End: 2025-04-26

## 2025-04-26 NOTE — PROGRESS NOTES
"Subjective:     Verbal consent was acquired by the patient to use [employer] ambient listening note generation during this visit     Cira Astorga is a 69 y.o. female who presents for Cough (\"Severe cough\" head congestion x4 days now /Hx of COPD)       History of Present Illness  The patient presents for evaluation of a cough.    A persistent cough has escalated beyond her usual symptoms associated with COPD. The onset of these symptoms was on Tuesday, initially perceived as an allergic reaction. However, the severity of the cough increased by nightfall, leading to 2 days of missed work due to sleep disruption. Difficulty in expectorating sputum and an increase in dyspnea are reported. A sensation of nasal congestion and ear fullness, along with chest discomfort, is described. Coughing episodes are intermittent but severe, particularly at night. Previous steroid therapy has been tolerated well. The supply of cough medication has been exhausted. The cough was severe enough to disrupt sleep on the first night, but it has since improved. Supplemental oxygen is not required at home, and she has been abstinent from smoking for the past 10 years. There is no history of pneumonia or fever. Current medications include Trelegy and albuterol inhalers, with an increased frequency of albuterol use. There is a history of COPD exacerbations requiring antibiotic and steroid therapy.    She is on Plaquenil for lupus.    SOCIAL HISTORY  The patient quit smoking 10 years ago.        Medications:  buPROPion SR Tb12  CALTRATE 600 PLUS-VIT D PO  hydroxychloroquine Tabs  meloxicam  mometasone-formoterol Aero  Multivital Tabs  sucralfate Tabs  Trelegy Ellipta Aepb    Allergies:             Patient has no known allergies.    Past Social Hx:  Cira Astorga  reports that she quit smoking about 47 years ago. Her smoking use included cigarettes. She started smoking about 8 years ago. She has a 76 pack-year smoking history. She has never " "used smokeless tobacco. She reports that she does not drink alcohol and does not use drugs.           Problem list, medications, and allergies reviewed by myself today in Epic.     Objective:     /62 (BP Location: Left arm, Patient Position: Sitting, BP Cuff Size: Adult)   Pulse 89   Temp 36.8 °C (98.3 °F) (Temporal)   Resp 15   Ht 1.702 m (5' 7\")   Wt 53.5 kg (118 lb)   SpO2 97%   BMI 18.48 kg/m²     Physical Exam  Vitals and nursing note reviewed.   Constitutional:       General: She is not in acute distress.     Appearance: She is well-developed. She is not ill-appearing or diaphoretic.   HENT:      Head: Normocephalic.      Right Ear: Tympanic membrane, ear canal and external ear normal.      Left Ear: Tympanic membrane, ear canal and external ear normal.      Nose: Mucosal edema and rhinorrhea present.      Mouth/Throat:      Pharynx: Posterior oropharyngeal erythema present.   Eyes:      General:         Right eye: No discharge.         Left eye: No discharge.      Conjunctiva/sclera: Conjunctivae normal.      Pupils: Pupils are equal, round, and reactive to light.   Cardiovascular:      Rate and Rhythm: Normal rate and regular rhythm.      Pulses: Normal pulses.      Heart sounds: Normal heart sounds. No murmur heard.     No friction rub.   Pulmonary:      Effort: Pulmonary effort is normal. No accessory muscle usage or respiratory distress.      Breath sounds: No stridor. Rhonchi present. No wheezing or rales.      Comments: Expiratory rhonchi with trace wheeze.  No rales.  No respiratory distress.  Decreased breath sounds.  Abdominal:      Palpations: Abdomen is soft.   Musculoskeletal:         General: Normal range of motion.      Cervical back: Normal range of motion.      Right lower leg: No edema.      Left lower leg: No edema.   Lymphadenopathy:      Cervical: No cervical adenopathy.   Skin:     General: Skin is warm and dry.   Neurological:      Mental Status: She is alert and oriented to " person, place, and time.         Physical Exam  Mouth/Throat: Mucous membranes moist, no erythema, no exudate  Respiratory: Rhonchi noted, no wheezing, rales or crackles          Assessment/Plan:     Diagnosis and Associated Orders:     1. COPD with acute exacerbation (HCC)  - doxycycline (MONODOX) 100 MG capsule; Take 1 Capsule by mouth 2 times a day for 5 days.  Dispense: 10 Capsule; Refill: 0  - methylPREDNISolone (MEDROL DOSEPAK) 4 MG Tablet Therapy Pack; Follow schedule on package instructions.  Dispense: 21 Tablet; Refill: 0  - benzonatate (TESSALON) 100 MG Cap; Take 1 Capsule by mouth 3 times a day as needed for Cough.  Dispense: 20 Capsule; Refill: 0    Other orders  - albuterol 108 (90 Base) MCG/ACT Aero Soln inhalation aerosol; 1 puff as needed Inhalation every 4 hrs        Medical Decision Making:    Jesús 69 y.o. presents to clinic with:    Assessment & Plan  This is a a chronic problem, COPD, with acute exacerbation  - Symptoms include increased cough and shortness of breath beyond baseline.  - Physical exam reveals rhonchorous lung sounds, but no wheezing or crackles indicative of pneumonia. Oxygen saturation is 97%.  - Discussion includes the likelihood of a viral etiology, but given the COPD history with increased cough, shortness of breath, antibiotics and steroids are favored. The patient is advised to use albuterol every 6 hours and continue Trelegy.  - Prescriptions for doxycycline, Medrol Dosepak, and benzonatate are provided. Probiotics are recommended with doxycycline.  Avoid coadministration of NSAIDs with steroid.. If symptoms worsen, a chest x-ray may be required.      I personally reviewed prior external notes and test results pertinent to today's visit. Patient is clinically stable at today's urgent care visit.  Patient appears nontoxic with no acute distress noted. Appropriate for outpatient care at this time.  Return to clinic or go to ED if symptoms worsen or persist.  Red flag  symptoms discussed.  Patient/Parent/Guardian voices understanding.   All side effects of medication discussed including allergic response, GI upset, tendon injury, rash, sedation etc    Please note that this dictation was created using voice recognition software. I have made a reasonable attempt to correct obvious errors, but I expect that there are errors of grammar and possibly content that I did not discover before finalizing the note.    This note was electronically signed by Estefany Maddox PA-C

## (undated) DEVICE — MASK AIRWAY FLEXIBLE SINGLE-USE SIZE 4 ADULTS (10EA/BX)

## (undated) DEVICE — COVER LIGHT HANDLE FLEXIBLE - SOFT (2EA/PK 80PK/CA)

## (undated) DEVICE — GOWN WARMING STANDARD FLEX - (30/CA)

## (undated) DEVICE — GLOVE BIOGEL SZ 7.5 SURGICAL PF LTX - (50PR/BX 4BX/CA)

## (undated) DEVICE — BLADE BEAVER 6400 MINI EYE ROUND TIP SHARP ON ONE SIDE (20/CA)

## (undated) DEVICE — BLADE SAGITTAL SAW 9.4MM X 25.5MM X .4MM FINE TOOTH (1/EA)

## (undated) DEVICE — MASK OXYGEN VNYL ADLT MED CONC WITH 7 FOOT TUBING  - (50EA/CA)

## (undated) DEVICE — NEEDLE NON SAFETY 25 GA X 1 1/2 IN HYPO (100EA/BX)

## (undated) DEVICE — PAD PREP 24 X 48 CUFFED - (100/CA)

## (undated) DEVICE — SUCTION INSTRUMENT YANKAUER BULBOUS TIP W/O VENT (50EA/CA)

## (undated) DEVICE — CANISTER SUCTION RIGID RED 1500CC (40EA/CA)

## (undated) DEVICE — PACK LOWER EXTREMITY - (2/CA)

## (undated) DEVICE — SENSOR OXIMETER ADULT SPO2 RD SET (20EA/BX)

## (undated) DEVICE — LACTATED RINGERS INJ 1000 ML - (14EA/CA 60CA/PF)

## (undated) DEVICE — WRAP CO-FLEX 4IN X 5YD STERIL - SELF-ADHERENT (18/CA)

## (undated) DEVICE — SODIUM CHL IRRIGATION 0.9% 1000ML (12EA/CA)

## (undated) DEVICE — GLOVE BIOGEL M SZ 8 SURGICAL PF LTX - (50/BX 4BX/CA)

## (undated) DEVICE — TOWEL STOP TIMEOUT SAFETY FLAG (40EA/CA)

## (undated) DEVICE — BANDAGE STERILE 3 IN X 75 IN (12EA/BX 8BX/CA)

## (undated) DEVICE — SUTURE 4-0 VICRYL PLUS FS-2 - 27 INCH (36/BX)

## (undated) DEVICE — SET LEADWIRE 5 LEAD BEDSIDE DISPOSABLE ECG (1SET OF 5/EA)

## (undated) DEVICE — SUTURE 5-0 ETHILON FS-2 18 (12PK/BX)"

## (undated) DEVICE — CANISTER SUCTION 3000ML MECHANICAL FILTER AUTO SHUTOFF MEDI-VAC NONSTERILE LF DISP  (40EA/CA)

## (undated) DEVICE — CANNULA O2 COMFORT SOFT EAR ADULT 7 FT TUBING (50/CA)

## (undated) DEVICE — SYRINGE 10 ML CONTROL LL (25EA/BX 4BX/CA)

## (undated) DEVICE — KIT  I.V. START (100EA/CA)

## (undated) DEVICE — PIN GUARD GREEN .62IN 1.6MM (2EA/PK 40PK/BX) MUST ORDER 2 BOX MINIMUM

## (undated) DEVICE — WATER IRRIGATION STERILE 1000ML (12EA/CA)

## (undated) DEVICE — TUBE CONNECTING SUCTION - CLEAR PLASTIC STERILE 72 IN (50EA/CA)

## (undated) DEVICE — DRAPE STRLE REG TOWEL 18X24 - (10/BX 4BX/CA)"

## (undated) DEVICE — SUTURE GENERAL

## (undated) DEVICE — SUTURE 3-0 VICRYL PLUS FS-1 27 (36PK/BX)"

## (undated) DEVICE — SLEEVE VASO CALF MED - (10PR/CA)

## (undated) DEVICE — TUBING CLEARLINK DUO-VENT - C-FLO (48EA/CA)